# Patient Record
Sex: MALE | Race: WHITE
[De-identification: names, ages, dates, MRNs, and addresses within clinical notes are randomized per-mention and may not be internally consistent; named-entity substitution may affect disease eponyms.]

---

## 2019-10-22 ENCOUNTER — HOSPITAL ENCOUNTER (OUTPATIENT)
Dept: HOSPITAL 76 - DI | Age: 44
Discharge: HOME | End: 2019-10-22
Attending: HOSPITAL
Payer: COMMERCIAL

## 2019-10-22 DIAGNOSIS — M25.552: Primary | ICD-10-CM

## 2019-10-22 PROCEDURE — 77002 NEEDLE LOCALIZATION BY XRAY: CPT

## 2019-10-22 PROCEDURE — 27093 INJECTION FOR HIP X-RAY: CPT

## 2019-10-22 PROCEDURE — 73722 MRI JOINT OF LWR EXTR W/DYE: CPT

## 2019-10-22 NOTE — XRAY REPORT
Reason:  LT HIP PAIN

Procedure Date:  10/22/2019   

Accession Number:  [id number] / [id number]                    

Procedure:  FL  - Arthrogram Needle Placement CPT Code:  

 

FULL RESULT:

 

 

EXAM:

LEFT HIP ARTHROGRAPHIC INJECTION WITH FLUOROSCOPIC GUIDANCE

 

EXAM DATE: 10/22/2019 01:10 PM.

 

CLINICAL HISTORY: LT HIP PAIN.

 

COMPARISON: ARTHROGRAM HIP 10/22/2019 1:41 PM.

 

TECHNIQUE: The risks, benefits, and alternatives of the procedure were 

discussed with the patient. All questions were answered. Written and 

verbal consent were obtained. The hip joint was marked under fluoroscopy 

and prepped and draped in a sterile manner. Local anesthesia was 

performed with 1% lidocaine. A 22-gauge needle was then inserted into the 

hip joint. 10 mL of a solution containing 25% 1% lidocaine, 25% iodinated 

contrast, and a 1:200 dilution of gadolinium contrast in sterile saline 

was then injected. The needle was removed without immediate complication. 

Other: None.

Fluoroscopy Time: 1.34 minutes.

Number of Images: 5.

 

FINDINGS:

Bones and Joints: No fracture or subluxation.

 

Injection: Fluoroscopic images demonstrate needle placement and contrast 

in the hip joint.

IMPRESSION: Successful fluoroscopically guided arthrographic injection of 

the left hip.

 

 

[facility]

## 2019-10-23 NOTE — MRI REPORT
Reason:  LT HIP PAIN

Procedure Date:  10/22/2019   

Accession Number:  [id number] / [id number]                    

Procedure:  MRI - Arthrogram Hip LT CPT Code:  

 

FULL RESULT:

 

 

EXAM:

LEFT HIP MRI ARTHROGRAM WITH CONTRAST

 

EXAM DATE: 10/22/2019 02:17 PM.

 

CLINICAL HISTORY: Chronic left hip pain.

 

COMPARISON: None.

 

TECHNIQUE: Multiplanar, multisequence T1-weighted and fluid-sensitive, 

small field-of-view sequences of the hip and large field-of-view 

sequences of the pelvis after an arthrographic injection of dilute 

gadolinium, dictated under a separate exam. Other: None.

 

FINDINGS:

Bones: No fractures or subluxations. No marrow edema or bone lesions.

 

Left Hip: No acetabular retroversion. Femoral head/neck offset is within 

normal limits. No loose bodies. The articular cartilage is intact. Focal 

linear intermediate signal anterior left hip acetabular labrum single 

sagittal image again (image 15 series 701). Negative for superior left 

hip labrum tear. Linear intermediate signal anterior left hip acetabular 

labrum axial oblique sequence (image 12 series 601). The ligamentum teres 

is intact.

 

Other Joints: The visualized lumbar spine, sacroiliac joints, symphysis 

pubis, and contralateral hip are unremarkable.

 

Musculature: No edema or fatty atrophy. The gluteus medius and minimus 

tendons are normal. The visualized hamstring tendons are normal. The 

ischiofemoral space is normal.

 

Pelvic Cavity: The visualized viscera are unremarkable. No 

lymphadenopathy. No free fluid in the pelvis.

 

Other: The visualized sciatic nerves are unremarkable. No bursitis. The 

subcutaneous tissues are unremarkable.

IMPRESSION: Focal linear noncontrast intermediate signal base anterior 

left hip acetabular labrum   (image 15 series 701) indeterminate for 

labrum tear.

 

[facility]

## 2020-09-21 ENCOUNTER — HOSPITAL ENCOUNTER (OUTPATIENT)
Age: 45
End: 2020-09-21
Payer: OTHER GOVERNMENT

## 2020-09-21 DIAGNOSIS — M19.012: ICD-10-CM

## 2020-09-21 DIAGNOSIS — S43.492A: ICD-10-CM

## 2020-09-21 DIAGNOSIS — M75.112: ICD-10-CM

## 2020-09-21 DIAGNOSIS — M25.512: Primary | ICD-10-CM

## 2020-09-21 PROCEDURE — 73222 MRI JOINT UPR EXTREM W/DYE: CPT

## 2020-09-21 PROCEDURE — 77002 NEEDLE LOCALIZATION BY XRAY: CPT

## 2020-09-21 PROCEDURE — 23350 INJECTION FOR SHOULDER X-RAY: CPT

## 2020-09-21 NOTE — DI.MRI.S_ITS
PROCEDURE:  MR SHOULDER LT W CON  
   
INDICATIONS:  PAIN IN LEFT SHOULDER  
   
TECHNIQUE:    
After the administration of 12 mL of dilute intra-articular Gadolinium contrast, oblique   
coronal T1 and T2 spin echo with fat saturation, oblique sagittal T1 spin echo with and   
without fat saturation, oblique sagittal T2 fast spin echo with fat saturation, axial T1   
spin echo with fat saturation through the shoulder.    
   
COMPARISON:  None.  
   
FINDINGS:    
Image quality:  Excellent.    
   
Rotator cuff:  There is tendinosis and low-grade articular surface partial-thickness tear   
involving distal supraspinatus at its insertion on the humeral head.  Low-grade bursal   
surface partial thickness tear involving distal supraspinatus is also seen near   
musculotendinous junction.  Distal infraspinatus tendon is intact.  Distal subscapularis   
tendon is intact.  No full-thickness rotator cuff tendon rupture.  No rotator cuff muscle   
atrophy on sagittal images.    
   
Bones and bursae:  No bone marrow contusions or fractures.  Acromioclavicular joint   
osteoarthritic changes are noted with downward osteophyte formation depressing the   
musculotendinous junction of supraspinatus.    
   
Capsule and soft tissues:  There is contrast extension and contour irregularity involving   
superior anterior labrum at 11 to 2 o'clock position.  Adjacent septated paralabral cyst   
is seen measures 2 x 1.4 x 1.1 centimeters in size.  The  glenohumeral ligaments appear   
intact.  The long head of the biceps tendon demonstrates normal location and morphology.    
The rotator interval appears normal, without fibrosis.  The coracohumeral ligament is of   
normal thickness.  No intra-articular bodies.    
   
IMPRESSION:    
1. Superior anterior labral tear at 11 to 2 o'clock position with adjacent 2 x 1.4 x 1.1   
cm perilabral cyst.  
2. Tendinosis and low-grade articular and bursal surface partial thickness tear involving   
distal supraspinatus as above.  No full-thickness rotator cuff tendon rupture.  
3. Mild to moderate acromioclavicular joint osteoarthritis.  
   
   
Dictated by: Dallas Diaz M.D. on 9/21/2020 at 12:12       
Approved by: Dallas Diaz M.D. on 9/21/2020 at 12:36

## 2020-09-21 NOTE — DI.RAD.S_ITS
PROCEDURE:  FL SHOULDER INJECTION MR/CT LT  
   
INDICATIONS:  PAIN IN LEFT SHOULDER  
   
TECHNIQUE:    
The indications, alternatives, benefits, risks, and complications of the procedure were   
explained to the patient.  Written informed consent was obtained and placed in the chart.   
 The shoulder was examined fluoroscopically and a site for needle placement chosen for   
entry into the glenohumeral joint from an anterior approach.  The skin was prepped and   
draped in a sterile fashion, and 1% lidocaine infiltrated from skin down to joint   
capsule.  A spinal needle was inserted into the glenohumeral joint, and a small amount of   
iodinated contrast media injected to confirm intra-articular placement of the needle tip.   
 This was followed by approximately 12 mL dilute solution of a gadolinium containing MR   
contrast agent.    
   
The needle was removed and a dressing was applied.  The patient was given postprocedural   
instructions and sent to the MR suite for MR imaging.    
   
FINDINGS:  A single fluoroscopic spot image demonstrates intra-articular location of   
injected iodinated contrast.  
   
IMPRESSION:  Successful fluoroscopically guided administration of dilute Gadolinium   
solution into the shoulder joint for MR arthrogram.    
   
   
Dictated by: Goldy Bal M.D. on 9/21/2020 at 10:39       
Approved by: Goldy Bal M.D. on 9/21/2020 at 10:39

## 2022-01-10 ENCOUNTER — HOSPITAL ENCOUNTER (OUTPATIENT)
Age: 47
End: 2022-01-10
Payer: OTHER GOVERNMENT

## 2022-01-10 DIAGNOSIS — Z20.822: ICD-10-CM

## 2022-01-10 DIAGNOSIS — Z01.812: Primary | ICD-10-CM

## 2022-01-10 PROCEDURE — 87635 SARS-COV-2 COVID-19 AMP PRB: CPT

## 2022-01-11 ENCOUNTER — HOSPITAL ENCOUNTER (OUTPATIENT)
Age: 47
Discharge: HOME | End: 2022-01-11
Payer: OTHER GOVERNMENT

## 2022-01-11 VITALS
SYSTOLIC BLOOD PRESSURE: 152 MMHG | OXYGEN SATURATION: 97 % | HEART RATE: 92 BPM | DIASTOLIC BLOOD PRESSURE: 89 MMHG | RESPIRATION RATE: 14 BRPM

## 2022-01-11 VITALS
TEMPERATURE: 98 F | HEART RATE: 71 BPM | OXYGEN SATURATION: 97 % | DIASTOLIC BLOOD PRESSURE: 100 MMHG | RESPIRATION RATE: 14 BRPM | SYSTOLIC BLOOD PRESSURE: 146 MMHG

## 2022-01-11 VITALS
OXYGEN SATURATION: 98 % | TEMPERATURE: 98 F | SYSTOLIC BLOOD PRESSURE: 139 MMHG | DIASTOLIC BLOOD PRESSURE: 87 MMHG | RESPIRATION RATE: 15 BRPM | HEART RATE: 89 BPM

## 2022-01-11 VITALS
DIASTOLIC BLOOD PRESSURE: 108 MMHG | TEMPERATURE: 97.7 F | RESPIRATION RATE: 16 BRPM | SYSTOLIC BLOOD PRESSURE: 173 MMHG | OXYGEN SATURATION: 99 % | HEART RATE: 94 BPM

## 2022-01-11 VITALS — BODY MASS INDEX: 29.9 KG/M2

## 2022-01-11 VITALS
DIASTOLIC BLOOD PRESSURE: 89 MMHG | RESPIRATION RATE: 22 BRPM | SYSTOLIC BLOOD PRESSURE: 138 MMHG | OXYGEN SATURATION: 97 % | HEART RATE: 92 BPM

## 2022-01-11 DIAGNOSIS — R19.4: ICD-10-CM

## 2022-01-11 DIAGNOSIS — D12.5: ICD-10-CM

## 2022-01-11 DIAGNOSIS — D12.3: ICD-10-CM

## 2022-01-11 DIAGNOSIS — K57.30: ICD-10-CM

## 2022-01-11 DIAGNOSIS — K62.5: Primary | ICD-10-CM

## 2022-01-11 DIAGNOSIS — Z72.0: ICD-10-CM

## 2022-01-11 PROCEDURE — 45380 COLONOSCOPY AND BIOPSY: CPT

## 2022-01-11 PROCEDURE — 45385 COLONOSCOPY W/LESION REMOVAL: CPT

## 2022-01-11 PROCEDURE — 0DJD8ZZ INSPECTION OF LOWER INTESTINAL TRACT, VIA NATURAL OR ARTIFICIAL OPENING ENDOSCOPIC: ICD-10-PCS | Performed by: INTERNAL MEDICINE

## 2022-01-13 ENCOUNTER — HOSPITAL ENCOUNTER (OUTPATIENT)
Age: 47
End: 2022-01-13
Payer: OTHER GOVERNMENT

## 2022-01-13 DIAGNOSIS — L60.3: Primary | ICD-10-CM

## 2022-01-13 LAB
ADD MANUAL DIFF / SLIDE REVIEW: NO
ALBUMIN SERPL-MCNC: 4.7 G/DL (ref 3.5–5)
ALBUMIN/GLOB SERPL: 1.8 {RATIO} (ref 1–2.8)
ALP SERPL-CCNC: 44 U/L (ref 38–126)
ALT SERPL-CCNC: 32 IU/L (ref ?–50)
GLOBULIN SER CALC-MCNC: 2.6 G/DL (ref 1.7–4.1)
HEMATOCRIT: 42 % (ref 41–53)
HEMOGLOBIN: 14.5 G/DL (ref 13.5–17.5)
HEMOLYSIS: < 15 (ref 0–50)
LYMPHOCYTES # SPEC AUTO: 1700 /UL (ref 1100–4500)
MCV RBC: 91.5 FL (ref 80–100)
MEAN CORPUSCULAR HEMOGLOBIN: 31.7 PG (ref 26–34)
MEAN CORPUSCULAR HGB CONC: 34.6 % (ref 30–36)
PLATELET COUNT: 303 X10^3/UL (ref 150–400)
PROT SERPL-MCNC: 7.3 G/DL (ref 6.3–8.2)

## 2022-01-13 PROCEDURE — 36415 COLL VENOUS BLD VENIPUNCTURE: CPT

## 2022-01-13 PROCEDURE — 80076 HEPATIC FUNCTION PANEL: CPT

## 2022-01-13 PROCEDURE — 85025 COMPLETE CBC W/AUTO DIFF WBC: CPT

## 2022-03-07 ENCOUNTER — HOSPITAL ENCOUNTER (EMERGENCY)
Age: 47
Discharge: HOME | End: 2022-03-07
Payer: OTHER GOVERNMENT

## 2022-03-07 VITALS — OXYGEN SATURATION: 97 % | HEART RATE: 71 BPM

## 2022-03-07 VITALS — HEART RATE: 81 BPM | SYSTOLIC BLOOD PRESSURE: 153 MMHG | OXYGEN SATURATION: 98 % | DIASTOLIC BLOOD PRESSURE: 110 MMHG

## 2022-03-07 VITALS — OXYGEN SATURATION: 97 % | HEART RATE: 74 BPM

## 2022-03-07 VITALS
OXYGEN SATURATION: 99 % | HEART RATE: 91 BPM | DIASTOLIC BLOOD PRESSURE: 117 MMHG | SYSTOLIC BLOOD PRESSURE: 179 MMHG | TEMPERATURE: 97.7 F | RESPIRATION RATE: 16 BRPM

## 2022-03-07 VITALS — OXYGEN SATURATION: 97 % | SYSTOLIC BLOOD PRESSURE: 169 MMHG | DIASTOLIC BLOOD PRESSURE: 98 MMHG | HEART RATE: 82 BPM

## 2022-03-07 VITALS — HEART RATE: 73 BPM | OXYGEN SATURATION: 96 %

## 2022-03-07 VITALS — BODY MASS INDEX: 29.9 KG/M2

## 2022-03-07 DIAGNOSIS — K62.5: Primary | ICD-10-CM

## 2022-03-07 LAB
ADD MANUAL DIFF / SLIDE REVIEW: NO
ALBUMIN SERPL-MCNC: 4.9 G/DL (ref 3.5–5)
ALBUMIN/GLOB SERPL: 1.5 {RATIO} (ref 1–2.8)
ALP SERPL-CCNC: 42 U/L (ref 38–126)
ALT SERPL-CCNC: 36 IU/L (ref ?–50)
BUN SERPL-MCNC: 15 MG/DL (ref 9–20)
CALCIUM SERPL-MCNC: 9.7 MG/DL (ref 8.4–10.2)
CHLORIDE SERPL-SCNC: 105 MMOL/L (ref 98–107)
CO2 SERPL-SCNC: 31 MMOL/L (ref 22–32)
ESTIMATED GLOMERULAR FILT RATE: > 60 ML/MIN (ref 60–?)
GLOBULIN SER CALC-MCNC: 3.3 G/DL (ref 1.7–4.1)
GLUCOSE SERPL-MCNC: 111 MG/DL (ref 70–100)
HEMATOCRIT: 43.8 % (ref 41–53)
HEMOGLOBIN: 15 G/DL (ref 13.5–17.5)
HEMOLYSIS: < 15 (ref 0–50)
INR PPP: 1.2 (ref 0.9–1.3)
LYMPHOCYTES # SPEC AUTO: 1600 /UL (ref 1100–4500)
MCV RBC: 91.9 FL (ref 80–100)
MEAN CORPUSCULAR HEMOGLOBIN: 31.6 PG (ref 26–34)
MEAN CORPUSCULAR HGB CONC: 34.4 % (ref 30–36)
PLATELET COUNT: 321 X10^3/UL (ref 150–400)
POTASSIUM SERPL-SCNC: 4 MMOL/L (ref 3.4–5.1)
PROT SERPL-MCNC: 8.2 G/DL (ref 6.3–8.2)
PROTHROMBIN TIME: 12.8 SECONDS (ref 10.1–12.7)
PTT PARTIAL THROMBOPLASTIN TIM: 34 SECONDS (ref 26.4–36.2)
SODIUM SERPL-SCNC: 140 MMOL/L (ref 137–145)

## 2022-03-07 PROCEDURE — 86900 BLOOD TYPING SEROLOGIC ABO: CPT

## 2022-03-07 PROCEDURE — 86850 RBC ANTIBODY SCREEN: CPT

## 2022-03-07 PROCEDURE — 99283 EMERGENCY DEPT VISIT LOW MDM: CPT

## 2022-03-07 PROCEDURE — 99284 EMERGENCY DEPT VISIT MOD MDM: CPT

## 2022-03-07 PROCEDURE — 74177 CT ABD & PELVIS W/CONTRAST: CPT

## 2022-03-07 PROCEDURE — 80053 COMPREHEN METABOLIC PANEL: CPT

## 2022-03-07 PROCEDURE — 86901 BLOOD TYPING SEROLOGIC RH(D): CPT

## 2022-03-07 PROCEDURE — 85730 THROMBOPLASTIN TIME PARTIAL: CPT

## 2022-03-07 PROCEDURE — 85610 PROTHROMBIN TIME: CPT

## 2022-03-07 PROCEDURE — 85025 COMPLETE CBC W/AUTO DIFF WBC: CPT

## 2022-03-16 ENCOUNTER — HOSPITAL ENCOUNTER (OUTPATIENT)
Age: 47
End: 2022-03-16
Payer: OTHER GOVERNMENT

## 2022-03-16 DIAGNOSIS — C19: Primary | ICD-10-CM

## 2022-03-16 PROCEDURE — 72197 MRI PELVIS W/O & W/DYE: CPT
